# Patient Record
Sex: MALE | Race: WHITE | Employment: FULL TIME | URBAN - NONMETROPOLITAN AREA
[De-identification: names, ages, dates, MRNs, and addresses within clinical notes are randomized per-mention and may not be internally consistent; named-entity substitution may affect disease eponyms.]

---

## 2019-12-27 ENCOUNTER — HOSPITAL ENCOUNTER (OUTPATIENT)
Dept: NON INVASIVE DIAGNOSTICS | Age: 49
Discharge: HOME | End: 2019-12-27
Payer: COMMERCIAL

## 2019-12-27 LAB
LV EF: 65 %
LVEF MODALITY: NORMAL

## 2019-12-27 PROCEDURE — 93306 TTE W/DOPPLER COMPLETE: CPT

## 2022-06-17 ENCOUNTER — HOSPITAL ENCOUNTER (OUTPATIENT)
Dept: CARDIAC CATH/INVASIVE PROCEDURES | Age: 52
Discharge: HOME | End: 2022-06-17
Payer: COMMERCIAL

## 2022-06-17 VITALS
RESPIRATION RATE: 19 BRPM | WEIGHT: 313 LBS | BODY MASS INDEX: 43.82 KG/M2 | SYSTOLIC BLOOD PRESSURE: 141 MMHG | DIASTOLIC BLOOD PRESSURE: 54 MMHG | TEMPERATURE: 98.2 F | OXYGEN SATURATION: 96 % | HEART RATE: 67 BPM | HEIGHT: 71 IN

## 2022-06-17 LAB
GFR SERPL CREATININE-BSD FRML MDRD: NORMAL ML/MIN/{1.73_M2}
GLUCOSE BLD-MCNC: 145 MG/DL (ref 74–100)
PLATELET # BLD: 285 K/UL (ref 138–453)
POC BUN: 13 MG/DL (ref 8–26)
POC CHLORIDE: 105 MMOL/L (ref 98–107)
POC CREATININE: 0.93 MG/DL (ref 0.51–1.19)
POC HEMATOCRIT: 45 % (ref 41–53)
POC HEMOGLOBIN: 15.2 G/DL (ref 13.5–17.5)
POC POTASSIUM: 4 MMOL/L (ref 3.5–4.5)
POC SODIUM: 142 MMOL/L (ref 138–146)

## 2022-06-17 PROCEDURE — 7100000001 HC PACU RECOVERY - ADDTL 15 MIN

## 2022-06-17 PROCEDURE — 2709999900 HC NON-CHARGEABLE SUPPLY

## 2022-06-17 PROCEDURE — 85049 AUTOMATED PLATELET COUNT: CPT

## 2022-06-17 PROCEDURE — 6360000004 HC RX CONTRAST MEDICATION

## 2022-06-17 PROCEDURE — 82435 ASSAY OF BLOOD CHLORIDE: CPT

## 2022-06-17 PROCEDURE — 93458 L HRT ARTERY/VENTRICLE ANGIO: CPT

## 2022-06-17 PROCEDURE — 6360000002 HC RX W HCPCS

## 2022-06-17 PROCEDURE — 84520 ASSAY OF UREA NITROGEN: CPT

## 2022-06-17 PROCEDURE — 99152 MOD SED SAME PHYS/QHP 5/>YRS: CPT

## 2022-06-17 PROCEDURE — 84132 ASSAY OF SERUM POTASSIUM: CPT

## 2022-06-17 PROCEDURE — C1894 INTRO/SHEATH, NON-LASER: HCPCS

## 2022-06-17 PROCEDURE — C1892 INTRO/SHEATH,FIXED,PEEL-AWAY: HCPCS

## 2022-06-17 PROCEDURE — 82947 ASSAY GLUCOSE BLOOD QUANT: CPT

## 2022-06-17 PROCEDURE — 82565 ASSAY OF CREATININE: CPT

## 2022-06-17 PROCEDURE — 2500000003 HC RX 250 WO HCPCS

## 2022-06-17 PROCEDURE — 85014 HEMATOCRIT: CPT

## 2022-06-17 PROCEDURE — 7100000000 HC PACU RECOVERY - FIRST 15 MIN

## 2022-06-17 PROCEDURE — 84295 ASSAY OF SERUM SODIUM: CPT

## 2022-06-17 RX ORDER — SODIUM CHLORIDE 9 MG/ML
INJECTION, SOLUTION INTRAVENOUS CONTINUOUS
Status: DISCONTINUED | OUTPATIENT
Start: 2022-06-17 | End: 2022-06-18 | Stop reason: HOSPADM

## 2022-06-17 RX ORDER — INSULIN GLARGINE 100 [IU]/ML
60 INJECTION, SOLUTION SUBCUTANEOUS DAILY
COMMUNITY

## 2022-06-17 RX ORDER — ASPIRIN 81 MG/1
81 TABLET, CHEWABLE ORAL DAILY
COMMUNITY

## 2022-06-17 RX ORDER — ROSUVASTATIN CALCIUM 20 MG/1
20 TABLET, COATED ORAL DAILY
COMMUNITY

## 2022-06-17 RX ORDER — LOSARTAN POTASSIUM 50 MG/1
50 TABLET ORAL DAILY
COMMUNITY

## 2022-06-17 RX ADMIN — SODIUM CHLORIDE: 9 INJECTION, SOLUTION INTRAVENOUS at 10:15

## 2022-06-17 ASSESSMENT — PAIN SCALES - GENERAL: PAINLEVEL_OUTOF10: 4

## 2022-06-17 ASSESSMENT — PAIN DESCRIPTION - DESCRIPTORS: DESCRIPTORS: ACHING

## 2022-06-17 ASSESSMENT — PAIN DESCRIPTION - LOCATION: LOCATION: JAW

## 2022-06-17 ASSESSMENT — PAIN DESCRIPTION - ORIENTATION: ORIENTATION: LEFT

## 2022-06-17 NOTE — PROGRESS NOTES
Received post procedure to [facility] to room 11. Assessment obtained. Restrictions reviewed with patient. Post procedure pathway initiated. RT wristsite soft ,vascband aid dry and intact. No hematoma noted. Family at side. Patient without complaints.

## 2022-06-17 NOTE — PROGRESS NOTES
All discharge instructions reviewed, questions answered, paper signed and given copy. Patient discharged per ambulatory with family member and belongings. No s/s of hematoma to rt wrist. No neuro defecits noted. Presure dressing applied to rt wrist

## 2022-06-17 NOTE — H&P
[facility]    Consult / H&P               Today's Date: 6/17/2022  Patient Name: Rachel Chew  Date of admission: No admission date for patient encounter. Patient's age: 49 y.o., 1970  Admission Dx: No admission diagnoses are documented for this encounter. Reason for Consult:  Cardiac evaluation    Requesting Physician: No admitting provider for patient encounter. CHIEF COMPLAINT:  Elective procedure       History Obtained From:  patient    HISTORY OF PRESENT ILLNESS:      The patient is a 49 y.o. male is here for elective C   Patient is undergoing bariatric surgery and was c/o MARR and had stress test done and it was abnormal.     Past Medical History:   has a past medical history of Back pain, Hemorrhoids, Hyperlipidemia, Mitral valve prolapse, MVP (mitral valve prolapse), and Sleep apnea. Past Surgical History:   has a past surgical history that includes Tonsillectomy; Vasectomy; Toe amputation; Sigmoidoscopy (flexible attempted removal of polyp); Hemorrhoid surgery; and polypectomy. Home Medications:    Prior to Admission medications    Medication Sig Start Date End Date Taking? Authorizing Provider   celecoxib (CELEBREX) 200 MG capsule Take 1 capsule by mouth 2 times daily. 12/19/12   Bruce Patel MD   pravastatin (PRAVACHOL) 20 MG tablet Take 20 mg by mouth daily. Historical Provider, MD      No current facility-administered medications for this encounter. Allergies:  Codeine and Pcn [penicillins]    Social History:   reports that he quit smoking about 14 years ago. He has a 15.00 pack-year smoking history. He does not have any smokeless tobacco history on file. He reports current alcohol use. Family History: family history is not on file. No h/o sudden cardiac death. No for premature CAD    REVIEW OF SYSTEMS:    Constitutional: there has been no unanticipated weight loss. There's been No change in energy level, No change in activity level.      Eyes: No visual changes or diplopia. No scleral icterus. ENT: No Headaches  Cardiovascular: No cardiac history  Respiratory: No previous pulmonary problems, No cough  Gastrointestinal: No abdominal pain. No change in bowel or bladder habits. PHYSICAL EXAM:      There were no vitals taken for this visit. Constitutional and General Appearance: alert, cooperative, no distress and appears stated age  [de-identified]: PERRL, no cervical lymphadenopathy. No masses palpable. Normal oral mucosa  Respiratory:  Normal excursion and expansion without use of accessory muscles  Resp Auscultation: Good respiratory effort. No for increased work of breathing. On auscultation: clear to auscultation bilaterally  Cardiovascular: The apical impulse is not displaced  Heart tones are crisp and normal. regular S1 and S2.  Jugular venous pulsation Normal  The carotid upstroke is normal in amplitude and contour without delay or bruit  Peripheral pulses are symmetrical and full       Labs:     CBC: No results for input(s): WBC, HGB, HCT, PLT in the last 72 hours. BMP: No results for input(s): NA, K, CO2, BUN, CREATININE, LABGLOM, GLUCOSE in the last 72 hours. BNP: No results for input(s): BNP in the last 72 hours. PT/INR: No results for input(s): PROTIME, INR in the last 72 hours. APTT:No results for input(s): APTT in the last 72 hours. CARDIAC ENZYMES:No results for input(s): CKTOTAL, CKMB, CKMBINDEX, TROPONINI in the last 72 hours. FASTING LIPID PANEL:No results found for: HDL, LDLDIRECT, LDLCALC, TRIG  LIVER PROFILE:No results for input(s): AST, ALT, LABALBU in the last 72 hours. 1. Moderate size inferior wall of moderate ischemia. 2. No infarction. 3. Preserved LV systolic function, EF 63%. 4. No wall motion abnormalities      Assessment     1. Pre Op evaluation for bariatric surgery. 2 MARR with abnormal stress test     3. HTN: Under control    4.  Hyperlipidemia:     5. DM II: With elevated A1c    Plan :   Proceed with [facility] Follow post cath orders     Electronically signed by Sabra Hoyt MD on 6/17/2022 at 8:38 AM    [facility]      [phone]      I reviewed the patient history personally, and examined by me during the visit. I have reviewed the H&P/Consult / Progress note as completed, and made appropriate changes to the patient exam and treatment plans.       I have reviewed the case in details including physical exam and treatment plan with resident / fellow / NP    Patient treatment plan was explained to patient, correction in notes was made as appropriate, and discussed final arrangement based on  my evaluation and exam.    Additional Recommendations:      Kenny Galicia MD  [facility]

## 2022-06-17 NOTE — PROGRESS NOTES
Patient admitted, consent signed and questions answered. Patient ready for procedure. Call light to reach with side rails up 2 of 2. Rt wrist and  bilat groin hair clipped. Family at bedside with patient.

## 2022-11-28 LAB
AVERAGE GLUCOSE: NORMAL
HBA1C MFR BLD: 6.3 %

## 2023-01-07 LAB
ALBUMIN SERPL-MCNC: 4.9 G/DL
ALP BLD-CCNC: 84 U/L
ALT SERPL-CCNC: 26 U/L
AST SERPL-CCNC: 18 U/L
BASOPHILS ABSOLUTE: 0.04 /ΜL
BASOPHILS RELATIVE PERCENT: 0.8 %
BILIRUB SERPL-MCNC: 0.4 MG/DL (ref 0.1–1.4)
BUN BLDV-MCNC: 13 MG/DL
CALCIUM SERPL-MCNC: 10.3 MG/DL
CHLORIDE BLD-SCNC: 102 MMOL/L
CO2: 27 MMOL/L
CREAT SERPL-MCNC: 0.79 MG/DL
EOSINOPHILS ABSOLUTE: 0.25 /ΜL
EOSINOPHILS RELATIVE PERCENT: 4.8 %
FERRITIN: 300 NG/ML (ref 18–300)
GLUCOSE FASTING: 159 MG/DL
HCT VFR BLD CALC: 46.6 % (ref 41–53)
HEMOGLOBIN: 15.7 G/DL (ref 13.5–17.5)
LYMPHOCYTES ABSOLUTE: 1.97 /ΜL
LYMPHOCYTES RELATIVE PERCENT: 37.6 %
MAGNESIUM: 2 MG/DL
MCH RBC QN AUTO: 29.8 PG
MCHC RBC AUTO-ENTMCNC: 33.7 G/DL
MCV RBC AUTO: 88.4 FL
MONOCYTES ABSOLUTE: 0.32 /ΜL
MONOCYTES RELATIVE PERCENT: 6.1 %
NEUTROPHILS ABSOLUTE: 2.64 /ΜL
NEUTROPHILS RELATIVE PERCENT: 50.3 %
PDW BLD-RTO: 12.9 %
PHOSPHORUS: 3.2 MG/DL
PLATELET # BLD: 310 K/ΜL
PMV BLD AUTO: 11.1 FL
POTASSIUM SERPL-SCNC: 4.2 MMOL/L
RBC # BLD: 5.27 10^6/ΜL
SODIUM BLD-SCNC: 141 MMOL/L
TOTAL IRON BINDING CAPACITY: 289
TOTAL PROTEIN: 7.8 G/DL (ref 6.4–8.2)
VITAMIN B-12: 619
VITAMIN D 25-HYDROXY: 24
VITAMIN D2, 25 HYDROXY: NORMAL
VITAMIN D3,25 HYDROXY: NORMAL
WBC # BLD: 5.2 10^3/ML

## 2023-03-21 ENCOUNTER — OFFICE VISIT (OUTPATIENT)
Dept: PRIMARY CARE CLINIC | Age: 53
End: 2023-03-21
Payer: COMMERCIAL

## 2023-03-21 VITALS
WEIGHT: 262.2 LBS | RESPIRATION RATE: 20 BRPM | SYSTOLIC BLOOD PRESSURE: 118 MMHG | HEART RATE: 78 BPM | TEMPERATURE: 97.7 F | BODY MASS INDEX: 36.57 KG/M2 | OXYGEN SATURATION: 98 % | DIASTOLIC BLOOD PRESSURE: 70 MMHG

## 2023-03-21 DIAGNOSIS — I10 ESSENTIAL HYPERTENSION: ICD-10-CM

## 2023-03-21 DIAGNOSIS — Z12.11 COLON CANCER SCREENING: ICD-10-CM

## 2023-03-21 DIAGNOSIS — E78.5 DYSLIPIDEMIA: ICD-10-CM

## 2023-03-21 DIAGNOSIS — E11.9 TYPE 2 DIABETES MELLITUS WITHOUT COMPLICATION, WITHOUT LONG-TERM CURRENT USE OF INSULIN (HCC): Primary | ICD-10-CM

## 2023-03-21 PROBLEM — E78.00 HYPERCHOLESTEROLEMIA: Status: ACTIVE | Noted: 2023-03-21

## 2023-03-21 PROBLEM — R01.1 HEART MURMUR: Status: ACTIVE | Noted: 2023-03-21

## 2023-03-21 PROBLEM — E66.01 MORBID OBESITY (HCC): Status: ACTIVE | Noted: 2021-01-05

## 2023-03-21 PROBLEM — G47.33 OBSTRUCTIVE SLEEP APNEA SYNDROME: Status: ACTIVE | Noted: 2021-01-05

## 2023-03-21 PROBLEM — I25.10 ASCVD (ARTERIOSCLEROTIC CARDIOVASCULAR DISEASE): Status: ACTIVE | Noted: 2022-08-30

## 2023-03-21 PROCEDURE — 99204 OFFICE O/P NEW MOD 45 MIN: CPT | Performed by: NURSE PRACTITIONER

## 2023-03-21 PROCEDURE — 3078F DIAST BP <80 MM HG: CPT | Performed by: NURSE PRACTITIONER

## 2023-03-21 PROCEDURE — 3074F SYST BP LT 130 MM HG: CPT | Performed by: NURSE PRACTITIONER

## 2023-03-21 SDOH — ECONOMIC STABILITY: HOUSING INSECURITY
IN THE LAST 12 MONTHS, WAS THERE A TIME WHEN YOU DID NOT HAVE A STEADY PLACE TO SLEEP OR SLEPT IN A SHELTER (INCLUDING NOW)?: PATIENT REFUSED

## 2023-03-21 SDOH — ECONOMIC STABILITY: FOOD INSECURITY: WITHIN THE PAST 12 MONTHS, THE FOOD YOU BOUGHT JUST DIDN'T LAST AND YOU DIDN'T HAVE MONEY TO GET MORE.: PATIENT DECLINED

## 2023-03-21 SDOH — ECONOMIC STABILITY: INCOME INSECURITY: HOW HARD IS IT FOR YOU TO PAY FOR THE VERY BASICS LIKE FOOD, HOUSING, MEDICAL CARE, AND HEATING?: PATIENT DECLINED

## 2023-03-21 SDOH — ECONOMIC STABILITY: FOOD INSECURITY: WITHIN THE PAST 12 MONTHS, YOU WORRIED THAT YOUR FOOD WOULD RUN OUT BEFORE YOU GOT MONEY TO BUY MORE.: PATIENT DECLINED

## 2023-03-21 ASSESSMENT — PATIENT HEALTH QUESTIONNAIRE - PHQ9
SUM OF ALL RESPONSES TO PHQ QUESTIONS 1-9: 0
SUM OF ALL RESPONSES TO PHQ QUESTIONS 1-9: 0
1. LITTLE INTEREST OR PLEASURE IN DOING THINGS: 0
SUM OF ALL RESPONSES TO PHQ QUESTIONS 1-9: 0
SUM OF ALL RESPONSES TO PHQ QUESTIONS 1-9: 0
2. FEELING DOWN, DEPRESSED OR HOPELESS: 0
SUM OF ALL RESPONSES TO PHQ9 QUESTIONS 1 & 2: 0

## 2023-03-21 ASSESSMENT — ENCOUNTER SYMPTOMS
ORTHOPNEA: 0
ABDOMINAL PAIN: 0
SHORTNESS OF BREATH: 0
NAUSEA: 0
RHINORRHEA: 0
VOMITING: 0
SORE THROAT: 0
VISUAL CHANGE: 0
CONSTIPATION: 0
DIARRHEA: 0
BLURRED VISION: 0
COUGH: 0
WHEEZING: 0

## 2023-03-21 NOTE — PROGRESS NOTES
Psychiatric:         Mood and Affect: Mood normal.         Behavior: Behavior normal.       Data:     Lab Results   Component Value Date/Time     12/12/2012 01:20 PM    K 4.1 12/12/2012 01:20 PM     12/12/2012 01:20 PM    CO2 32 12/12/2012 01:20 PM    BUN 13 12/12/2012 01:20 PM    CREATININE 0.93 06/17/2022 09:56 AM    CREATININE 1.11 12/12/2012 01:20 PM    GLUCOSE 93 12/12/2012 01:20 PM    LABALBU 4.7 12/12/2012 01:20 PM    BILITOT 0.60 12/12/2012 01:20 PM    ALKPHOS 56 12/12/2012 01:20 PM    AST 23 12/12/2012 01:20 PM    ALT 36 12/12/2012 01:20 PM     Lab Results   Component Value Date/Time    WBC 4.7 12/12/2012 01:20 PM    RBC 4.64 12/12/2012 01:20 PM    HGB 13.9 12/12/2012 01:20 PM    HCT 41.7 12/12/2012 01:20 PM    MCV 89.8 12/12/2012 01:20 PM    MCH 30.0 12/12/2012 01:20 PM    MCHC 33.4 12/12/2012 01:20 PM    RDW 14.4 12/12/2012 01:20 PM     06/17/2022 10:35 AM    MPV NOT REPORTED 12/12/2012 01:20 PM     No results found for: TSH  No results found for: CHOL, LDL, HDL, PSA, LABA1C    Assessment/Plan:      Diagnosis Orders   1. Type 2 diabetes mellitus without complication, without long-term current use of insulin ([facility] [address]        2. Essential hypertension        3. Dyslipidemia        4. Colon cancer screening  Fecal DNA Colorectal cancer screening ([facility])        Continue healthy lifestyle. We will review labs. We will see him back in 6 months for routine check, sooner if any issues. 1.  Bossman received counseling on the following healthy behaviors: nutrition and exercise. 2.  Patient given educational materials - see patient instructions  3. Was a self-tracking handout given in paper form or via [employer]? No  If yes, see orders or list here. 4.  Discussed use, benefit, and side effects of prescribed medications. Barriers to medication compliance addressed. All patient questions answered. Pt voiced understanding. 5.  Reviewed prior labs and health maintenance  6.   Continue

## 2023-03-21 NOTE — PATIENT INSTRUCTIONS
SURVEY:     You may be receiving a survey from [employer] regarding your visit today. Please complete the survey to enable us to provide the highest quality of care to you and your family. If you cannot score us a very good on any question, please call the office to discuss how we could have made your experience a very good one.      Thank you,    Keira Esposito, APRN-CNP  Carmen Conley, APRN-CNP  Luis Duarte, DELANEYN  Onelia Gan, CAT Mclaughlin, CAT Ho, CMA  Chyna, PCA  Mary, PM

## 2023-09-21 ENCOUNTER — OFFICE VISIT (OUTPATIENT)
Dept: PRIMARY CARE CLINIC | Age: 53
End: 2023-09-21
Payer: COMMERCIAL

## 2023-09-21 VITALS
BODY MASS INDEX: 35.35 KG/M2 | SYSTOLIC BLOOD PRESSURE: 126 MMHG | WEIGHT: 252.5 LBS | DIASTOLIC BLOOD PRESSURE: 72 MMHG | HEART RATE: 78 BPM | OXYGEN SATURATION: 98 % | TEMPERATURE: 98.5 F | RESPIRATION RATE: 20 BRPM | HEIGHT: 71 IN

## 2023-09-21 DIAGNOSIS — E11.9 TYPE 2 DIABETES MELLITUS WITHOUT COMPLICATION, WITHOUT LONG-TERM CURRENT USE OF INSULIN (HCC): Primary | ICD-10-CM

## 2023-09-21 DIAGNOSIS — L03.032 ONYCHIA OF TOE, LEFT: ICD-10-CM

## 2023-09-21 DIAGNOSIS — M77.02 EPICONDYLITIS ELBOW, MEDIAL, LEFT: ICD-10-CM

## 2023-09-21 LAB — HBA1C MFR BLD: 4.8 %

## 2023-09-21 PROCEDURE — 3017F COLORECTAL CA SCREEN DOC REV: CPT | Performed by: NURSE PRACTITIONER

## 2023-09-21 PROCEDURE — 83036 HEMOGLOBIN GLYCOSYLATED A1C: CPT | Performed by: NURSE PRACTITIONER

## 2023-09-21 PROCEDURE — G8427 DOCREV CUR MEDS BY ELIG CLIN: HCPCS | Performed by: NURSE PRACTITIONER

## 2023-09-21 PROCEDURE — 2022F DILAT RTA XM EVC RTNOPTHY: CPT | Performed by: NURSE PRACTITIONER

## 2023-09-21 PROCEDURE — 3074F SYST BP LT 130 MM HG: CPT | Performed by: NURSE PRACTITIONER

## 2023-09-21 PROCEDURE — G8417 CALC BMI ABV UP PARAM F/U: HCPCS | Performed by: NURSE PRACTITIONER

## 2023-09-21 PROCEDURE — 99214 OFFICE O/P EST MOD 30 MIN: CPT | Performed by: NURSE PRACTITIONER

## 2023-09-21 PROCEDURE — 1036F TOBACCO NON-USER: CPT | Performed by: NURSE PRACTITIONER

## 2023-09-21 PROCEDURE — 3078F DIAST BP <80 MM HG: CPT | Performed by: NURSE PRACTITIONER

## 2023-09-21 PROCEDURE — 3044F HG A1C LEVEL LT 7.0%: CPT | Performed by: NURSE PRACTITIONER

## 2023-09-21 RX ORDER — TERBINAFINE HYDROCHLORIDE 250 MG/1
250 TABLET ORAL DAILY
Qty: 42 TABLET | Refills: 1 | Status: SHIPPED | OUTPATIENT
Start: 2023-09-21 | End: 2023-12-14

## 2023-09-21 RX ORDER — METHYLPREDNISOLONE 4 MG/1
TABLET ORAL
Qty: 1 KIT | Refills: 0 | Status: SHIPPED | OUTPATIENT
Start: 2023-09-21 | End: 2023-09-27

## 2023-09-21 SDOH — ECONOMIC STABILITY: INCOME INSECURITY: HOW HARD IS IT FOR YOU TO PAY FOR THE VERY BASICS LIKE FOOD, HOUSING, MEDICAL CARE, AND HEATING?: PATIENT DECLINED

## 2023-09-21 SDOH — ECONOMIC STABILITY: FOOD INSECURITY: WITHIN THE PAST 12 MONTHS, YOU WORRIED THAT YOUR FOOD WOULD RUN OUT BEFORE YOU GOT MONEY TO BUY MORE.: PATIENT DECLINED

## 2023-09-21 ASSESSMENT — ENCOUNTER SYMPTOMS
WHEEZING: 0
VISUAL CHANGE: 0
COLOR CHANGE: 1
ABDOMINAL PAIN: 0
SORE THROAT: 0
SHORTNESS OF BREATH: 0
CONSTIPATION: 0
RHINORRHEA: 0
DIARRHEA: 0
VOMITING: 0
NAUSEA: 0
BLURRED VISION: 0
COUGH: 0

## 2023-09-21 ASSESSMENT — PATIENT HEALTH QUESTIONNAIRE - PHQ9
2. FEELING DOWN, DEPRESSED OR HOPELESS: 0
1. LITTLE INTEREST OR PLEASURE IN DOING THINGS: 0
SUM OF ALL RESPONSES TO PHQ QUESTIONS 1-9: 0
SUM OF ALL RESPONSES TO PHQ9 QUESTIONS 1 & 2: 0
SUM OF ALL RESPONSES TO PHQ QUESTIONS 1-9: 0

## 2023-09-21 NOTE — PATIENT INSTRUCTIONS
SURVEY:     You may be receiving a survey from [employer] regarding your visit today. Please complete the survey to enable us to provide the highest quality of care to you and your family. If you cannot score us a very good on any question, please call the office to discuss how we could have made your experience a very good one.      Thank you,    Enrique Esposito, APRN-CNP  Sachi Granados, APRN-CNP  Carola Zarate, TALITA Salguero, CAT  [facility], CAT Ho, CAT Clemente, GOOD Hernandez, PM

## 2023-09-21 NOTE — PROGRESS NOTES
Name: Jasiel Reyes  : 1970         Chief Complaint:     Chief Complaint   Patient presents with    Diabetes     Routine check. Pain     Right elbow lifted up window \"felt like something pulled\"    Nail Problem     Left foot great toe. History of Present Illness:      Jasiel Reyes is a 47 y.o.  male who presents with Diabetes (Routine check.), Pain (Right elbow lifted up window \"felt like something pulled\"), and Nail Problem (Left foot great toe.)      Tylergarcia Bordenneeta is here today for a routine office visit. Toenail discoloration- has had for quite some time on left great toenail, getting thicker and would like to start oral treatment for fungus. Diabetes  He presents for his follow-up diabetic visit. He has type 2 diabetes mellitus. Onset time: YEARS. His disease course has been improving. There are no hypoglycemic associated symptoms. Pertinent negatives for hypoglycemia include no dizziness, headaches, mood changes, nervousness/anxiousness, pallor, sleepiness, speech difficulty or sweats. There are no diabetic associated symptoms. Pertinent negatives for diabetes include no blurred vision, no chest pain, no fatigue, no foot paresthesias, no polydipsia, no polyphagia, no polyuria and no visual change. There are no hypoglycemic complications. Pertinent negatives for hypoglycemia complications include no blackouts, no hospitalization, no nocturnal hypoglycemia and no required assistance. Symptoms are stable. There are no diabetic complications. Pertinent negatives for diabetic complications include no CVA, heart disease, nephropathy or peripheral neuropathy. Risk factors for coronary artery disease include diabetes mellitus, dyslipidemia, family history, obesity, male sex, hypertension and stress. Current diabetic treatment includes diet. He is compliant with treatment all of the time. His weight is stable. He is following a generally healthy diet.  Meal planning includes avoidance of concentrated

## 2024-09-23 ENCOUNTER — TELEPHONE (OUTPATIENT)
Dept: PRIMARY CARE CLINIC | Age: 54
End: 2024-09-23

## 2024-09-23 DIAGNOSIS — G47.33 OBSTRUCTIVE SLEEP APNEA SYNDROME: Primary | ICD-10-CM
